# Patient Record
Sex: MALE | Race: WHITE | Employment: FULL TIME | URBAN - METROPOLITAN AREA
[De-identification: names, ages, dates, MRNs, and addresses within clinical notes are randomized per-mention and may not be internally consistent; named-entity substitution may affect disease eponyms.]

---

## 2017-01-14 ENCOUNTER — HOSPITAL ENCOUNTER (OUTPATIENT)
Age: 62
Discharge: HOME | End: 2017-01-14
Attending: FAMILY MEDICINE
Payer: COMMERCIAL

## 2017-01-14 VITALS
RESPIRATION RATE: 16 BRPM | HEART RATE: 87 BPM | TEMPERATURE: 98 F | BODY MASS INDEX: 25 KG/M2 | DIASTOLIC BLOOD PRESSURE: 97 MMHG | WEIGHT: 163 LBS | OXYGEN SATURATION: 99 % | SYSTOLIC BLOOD PRESSURE: 155 MMHG

## 2017-01-14 DIAGNOSIS — M10.9 ACUTE GOUT INVOLVING TOE OF LEFT FOOT, UNSPECIFIED CAUSE: ICD-10-CM

## 2017-01-14 DIAGNOSIS — M10.9 ACUTE GOUT OF RIGHT ANKLE, UNSPECIFIED CAUSE: Primary | ICD-10-CM

## 2017-01-14 PROCEDURE — 99204 OFFICE O/P NEW MOD 45 MIN: CPT

## 2017-01-14 PROCEDURE — 99203 OFFICE O/P NEW LOW 30 MIN: CPT

## 2017-01-14 RX ORDER — METHYLPREDNISOLONE 4 MG/1
TABLET ORAL
Qty: 21 TABLET | Refills: 0 | Status: SHIPPED | OUTPATIENT
Start: 2017-01-14 | End: 2017-07-27 | Stop reason: ALTCHOICE

## 2017-01-14 RX ORDER — HYDROCODONE BITARTRATE AND ACETAMINOPHEN 5; 325 MG/1; MG/1
1 TABLET ORAL EVERY 4 HOURS PRN
Qty: 20 TABLET | Refills: 0 | Status: SHIPPED | OUTPATIENT
Start: 2017-01-14 | End: 2017-01-26 | Stop reason: DRUGHIGH

## 2017-01-14 NOTE — ED PROVIDER NOTES
[place] [id number] [facility] [place] [place]   Patient presents with:   Foot Pain  Ankle Pain    Stated Complaint: Ankle/Foot Pain    HPI    49-year-old male who presents to the clinic today with chief complaints of pain in his right ankle Take 1 tablet by mouth every 4 (four) hours as needed for Pain. CYANOCOBALAMIN 1000 MCG/ML Injection Solution,  INJECT 1000 MCG (1 ML) MONTHLY   BD LUER-CELIA SYRINGE 25G X 1\" 3 ML Does not apply Misc,  TO USE MONTHLY WHEN GIVING VIT B 12 INJECTIONS.    HY 0832 87   Resp 01/14/17 0832 16   Temp 01/14/17 0832 98 °F (36.7 °C)   Temp src 01/14/17 0832 Temporal   SpO2 01/14/17 0832 99 %   O2 Device 01/14/17 0832 None (Room air)       Current:/97 mmHg  Pulse 87  Temp(Src) 98 °F (36.7 °C) (Temporal)  Resp 16 Disp-21 tablet, R-0    !! HYDROcodone-acetaminophen 5-325 MG Oral Tab  Take 1 tablet by mouth every 4 (four) hours as needed for Pain., Print, Disp-20 tablet, R-0    !! - Potential duplicate medications found. Please discuss with provider.

## 2017-01-14 NOTE — ED INITIAL ASSESSMENT (HPI)
Patient c/o right foot and ankle pain that started about a week ago. Patient has hx of gout and states it feels similar to that. Patient states it started out in his right great toe and has traveled up his foot to his ankle. Tender to touch.

## 2017-11-19 PROCEDURE — 87070 CULTURE OTHR SPECIMN AEROBIC: CPT | Performed by: EMERGENCY MEDICINE

## 2017-11-19 PROCEDURE — 87205 SMEAR GRAM STAIN: CPT | Performed by: EMERGENCY MEDICINE

## 2017-11-19 PROCEDURE — 87075 CULTR BACTERIA EXCEPT BLOOD: CPT | Performed by: EMERGENCY MEDICINE

## 2017-11-21 PROBLEM — M70.22 OLECRANON BURSITIS OF LEFT ELBOW: Status: ACTIVE | Noted: 2017-11-21

## 2018-01-22 PROCEDURE — 82607 VITAMIN B-12: CPT | Performed by: FAMILY MEDICINE

## 2018-01-22 PROCEDURE — 82746 ASSAY OF FOLIC ACID SERUM: CPT | Performed by: FAMILY MEDICINE

## 2018-02-09 PROBLEM — G89.29 CHRONIC RIGHT SHOULDER PAIN: Status: ACTIVE | Noted: 2018-02-09

## 2018-02-09 PROBLEM — M17.11 PRIMARY OSTEOARTHRITIS OF RIGHT KNEE: Status: ACTIVE | Noted: 2018-02-09

## 2018-02-09 PROBLEM — M25.511 CHRONIC RIGHT SHOULDER PAIN: Status: ACTIVE | Noted: 2018-02-09

## 2018-05-29 PROBLEM — M1A.9XX0 CHRONIC GOUT INVOLVING TOE OF LEFT FOOT WITHOUT TOPHUS, UNSPECIFIED CAUSE: Status: ACTIVE | Noted: 2018-05-29

## 2018-07-13 PROCEDURE — 82607 VITAMIN B-12: CPT | Performed by: FAMILY MEDICINE

## 2019-07-29 ENCOUNTER — OFFICE VISIT (OUTPATIENT)
Dept: URBAN - METROPOLITAN AREA CLINIC 32 | Facility: CLINIC | Age: 64
End: 2019-07-29
Payer: COMMERCIAL

## 2019-07-29 PROCEDURE — 92004 COMPRE OPH EXAM NEW PT 1/>: CPT | Performed by: OPHTHALMOLOGY

## 2019-07-29 ASSESSMENT — INTRAOCULAR PRESSURE
OS: 14
OD: 14

## 2019-07-30 ENCOUNTER — OFFICE VISIT (OUTPATIENT)
Dept: URBAN - METROPOLITAN AREA CLINIC 33 | Facility: CLINIC | Age: 64
End: 2019-07-30
Payer: COMMERCIAL

## 2019-07-30 DIAGNOSIS — H34.12 CENTRAL RETINAL ARTERY OCCLUSION, LEFT EYE: Primary | ICD-10-CM

## 2019-07-30 PROCEDURE — 99204 OFFICE O/P NEW MOD 45 MIN: CPT | Performed by: OPHTHALMOLOGY

## 2019-07-30 PROCEDURE — 92134 CPTRZ OPH DX IMG PST SGM RTA: CPT | Performed by: OPHTHALMOLOGY

## 2019-07-30 ASSESSMENT — INTRAOCULAR PRESSURE
OD: 14
OS: 15

## 2019-07-30 ASSESSMENT — KERATOMETRY
OD: 43.00
OS: 43.25

## 2019-07-30 NOTE — IMPRESSION/PLAN
Impression: Central retinal artery occlusion, left eye: H34.12. Plan: OCT ordered and performed today. Discussed diagnosis with patient. The clinical exam and OCT testing are consistent with Central Retinal Artery Occlusion. Due to the increase risk of stroke, the patient was strongly advised to schedule an appointment with PCP for a full medical and cardiovascular evaluation. Recommend a carotid ultrasound. The patient was instructed to start taking aspirin 81mg. Pt states he went to ER yesterday. Recommend  return to ER should any additional stroke like symptoms arise. Pt agrees W/ plan.

## 2021-04-12 DIAGNOSIS — Z23 NEED FOR VACCINATION: ICD-10-CM

## 2023-04-21 NOTE — ED NOTES
Patient denies injury. States he has been walking a lot and going up and down stairs while touring [place] with his children.
Hospitalist
Hospitalist
Gastroenterology

## (undated) NOTE — ED AVS SNAPSHOT
[facility] in [address] [id number]    Phone:  [phone]    Fax:  [phone]   MRN: [id number]    Department:  [facility] in [facility]   Date of Visit:  1/14/2017           Eve Bring a paper prescription for each of these medications    - HYDROcodone-acetaminophen 5-325 MG Tabs            Discharge References/Attachments     GOUT ATTACKS, TREATING (ENGLISH)    GOUT, EATING TO PREVENT (ENGLISH)    GOUT DIET (ENGLISH)      [facility] care or specialist physician will see patients referred from the [facility]. Follow-up care is at the discretion of that Physician.     IF THERE IS ANY CHANGE OR WORSENING OF YOUR CONDITION, CALL YOUR PRIMARY CARE PHYSICIAN AT ONCE OR GO TO THE harming yourself, contact [facility] at [phone]. - If you don’t have insurance, Jessica Sanchez has partnered with Patient [address] to help you get signed up for insurance coverage.   Patient [place]